# Patient Record
Sex: FEMALE | Race: WHITE | NOT HISPANIC OR LATINO
[De-identification: names, ages, dates, MRNs, and addresses within clinical notes are randomized per-mention and may not be internally consistent; named-entity substitution may affect disease eponyms.]

---

## 2018-02-23 ENCOUNTER — APPOINTMENT (OUTPATIENT)
Dept: INTERNAL MEDICINE | Facility: CLINIC | Age: 53
End: 2018-02-23

## 2018-02-23 PROBLEM — Z00.00 ENCOUNTER FOR PREVENTIVE HEALTH EXAMINATION: Status: ACTIVE | Noted: 2018-02-23

## 2019-01-14 ENCOUNTER — INPATIENT (INPATIENT)
Facility: HOSPITAL | Age: 54
LOS: 2 days | Discharge: HOME | End: 2019-01-17
Attending: HOSPITALIST | Admitting: HOSPITALIST
Payer: COMMERCIAL

## 2019-01-14 VITALS
DIASTOLIC BLOOD PRESSURE: 73 MMHG | RESPIRATION RATE: 18 BRPM | OXYGEN SATURATION: 100 % | TEMPERATURE: 98 F | HEART RATE: 87 BPM | SYSTOLIC BLOOD PRESSURE: 121 MMHG

## 2019-01-14 DIAGNOSIS — Z90.711 ACQUIRED ABSENCE OF UTERUS WITH REMAINING CERVICAL STUMP: Chronic | ICD-10-CM

## 2019-01-14 DIAGNOSIS — Z01.818 ENCOUNTER FOR OTHER PREPROCEDURAL EXAMINATION: ICD-10-CM

## 2019-01-14 DIAGNOSIS — N80.9 ENDOMETRIOSIS, UNSPECIFIED: Chronic | ICD-10-CM

## 2019-01-14 DIAGNOSIS — K92.2 GASTROINTESTINAL HEMORRHAGE, UNSPECIFIED: ICD-10-CM

## 2019-01-14 DIAGNOSIS — Z29.9 ENCOUNTER FOR PROPHYLACTIC MEASURES, UNSPECIFIED: ICD-10-CM

## 2019-01-14 DIAGNOSIS — D50.0 IRON DEFICIENCY ANEMIA SECONDARY TO BLOOD LOSS (CHRONIC): ICD-10-CM

## 2019-01-14 LAB
ALBUMIN SERPL ELPH-MCNC: 3.9 G/DL — SIGNIFICANT CHANGE UP (ref 3.3–5)
ALP SERPL-CCNC: 70 U/L — SIGNIFICANT CHANGE UP (ref 40–120)
ALT FLD-CCNC: 14 U/L — SIGNIFICANT CHANGE UP (ref 4–33)
ANION GAP SERPL CALC-SCNC: 10 MEQ/L — SIGNIFICANT CHANGE UP (ref 7–14)
APTT BLD: 26.3 SEC — LOW (ref 27.5–36.3)
AST SERPL-CCNC: 15 U/L — SIGNIFICANT CHANGE UP (ref 4–32)
BASE EXCESS BLDV CALC-SCNC: 0.3 MMOL/L — SIGNIFICANT CHANGE UP
BASOPHILS # BLD AUTO: 0.05 K/UL — SIGNIFICANT CHANGE UP (ref 0–0.2)
BASOPHILS NFR BLD AUTO: 0.6 % — SIGNIFICANT CHANGE UP (ref 0–2)
BILIRUB SERPL-MCNC: 0.2 MG/DL — SIGNIFICANT CHANGE UP (ref 0.2–1.2)
BLD GP AB SCN SERPL QL: NEGATIVE — SIGNIFICANT CHANGE UP
BLOOD GAS VENOUS - CREATININE: 0.72 MG/DL — SIGNIFICANT CHANGE UP (ref 0.5–1.3)
BUN SERPL-MCNC: 14 MG/DL — SIGNIFICANT CHANGE UP (ref 7–23)
CALCIUM SERPL-MCNC: 8.9 MG/DL — SIGNIFICANT CHANGE UP (ref 8.4–10.5)
CHLORIDE BLDV-SCNC: 107 MMOL/L — SIGNIFICANT CHANGE UP (ref 96–108)
CHLORIDE SERPL-SCNC: 106 MMOL/L — SIGNIFICANT CHANGE UP (ref 98–107)
CO2 SERPL-SCNC: 22 MMOL/L — SIGNIFICANT CHANGE UP (ref 22–31)
CREAT SERPL-MCNC: 0.67 MG/DL — SIGNIFICANT CHANGE UP (ref 0.5–1.3)
EOSINOPHIL # BLD AUTO: 0.26 K/UL — SIGNIFICANT CHANGE UP (ref 0–0.5)
EOSINOPHIL NFR BLD AUTO: 2.9 % — SIGNIFICANT CHANGE UP (ref 0–6)
FERRITIN SERPL-MCNC: < 5 NG/ML — LOW (ref 15–150)
FOLATE SERPL-MCNC: 16.1 NG/ML — SIGNIFICANT CHANGE UP (ref 4.7–20)
GAS PNL BLDV: 139 MMOL/L — SIGNIFICANT CHANGE UP (ref 136–146)
GLUCOSE BLDV-MCNC: 99 — SIGNIFICANT CHANGE UP (ref 70–99)
GLUCOSE SERPL-MCNC: 99 MG/DL — SIGNIFICANT CHANGE UP (ref 70–99)
HCO3 BLDV-SCNC: 24 MMOL/L — SIGNIFICANT CHANGE UP (ref 20–27)
HCT VFR BLD CALC: 23.3 % — LOW (ref 34.5–45)
HCT VFR BLD CALC: 24.4 % — LOW (ref 34.5–45)
HCT VFR BLDV CALC: 21.9 % — LOW (ref 34.5–45)
HGB BLD-MCNC: 6.8 G/DL — CRITICAL LOW (ref 11.5–15.5)
HGB BLD-MCNC: 7.3 G/DL — LOW (ref 11.5–15.5)
HGB BLDV-MCNC: 7 G/DL — CRITICAL LOW (ref 11.5–15.5)
IMM GRANULOCYTES NFR BLD AUTO: 0.3 % — SIGNIFICANT CHANGE UP (ref 0–1.5)
INR BLD: 0.97 — SIGNIFICANT CHANGE UP (ref 0.88–1.17)
IRON SATN MFR SERPL: 466 UG/DL — SIGNIFICANT CHANGE UP (ref 140–530)
IRON SATN MFR SERPL: 7 UG/DL — LOW (ref 30–160)
LACTATE BLDV-MCNC: 0.9 MMOL/L — SIGNIFICANT CHANGE UP (ref 0.5–2)
LYMPHOCYTES # BLD AUTO: 3.79 K/UL — HIGH (ref 1–3.3)
LYMPHOCYTES # BLD AUTO: 42.1 % — SIGNIFICANT CHANGE UP (ref 13–44)
MCHC RBC-ENTMCNC: 22.9 PG — LOW (ref 27–34)
MCHC RBC-ENTMCNC: 23.5 PG — LOW (ref 27–34)
MCHC RBC-ENTMCNC: 29.2 % — LOW (ref 32–36)
MCHC RBC-ENTMCNC: 29.9 % — LOW (ref 32–36)
MCV RBC AUTO: 78.5 FL — LOW (ref 80–100)
MCV RBC AUTO: 78.7 FL — LOW (ref 80–100)
MONOCYTES # BLD AUTO: 0.93 K/UL — HIGH (ref 0–0.9)
MONOCYTES NFR BLD AUTO: 10.3 % — SIGNIFICANT CHANGE UP (ref 2–14)
NEUTROPHILS # BLD AUTO: 3.95 K/UL — SIGNIFICANT CHANGE UP (ref 1.8–7.4)
NEUTROPHILS NFR BLD AUTO: 43.8 % — SIGNIFICANT CHANGE UP (ref 43–77)
NRBC # FLD: 0 K/UL — LOW (ref 25–125)
NRBC # FLD: 0 K/UL — LOW (ref 25–125)
OB PNL STL: POSITIVE — SIGNIFICANT CHANGE UP
PCO2 BLDV: 45 MMHG — SIGNIFICANT CHANGE UP (ref 41–51)
PH BLDV: 7.37 PH — SIGNIFICANT CHANGE UP (ref 7.32–7.43)
PLATELET # BLD AUTO: 309 K/UL — SIGNIFICANT CHANGE UP (ref 150–400)
PLATELET # BLD AUTO: 336 K/UL — SIGNIFICANT CHANGE UP (ref 150–400)
PMV BLD: 11.9 FL — SIGNIFICANT CHANGE UP (ref 7–13)
PMV BLD: 12.5 FL — SIGNIFICANT CHANGE UP (ref 7–13)
PO2 BLDV: < 24 MMHG — LOW (ref 35–40)
POTASSIUM BLDV-SCNC: 3.7 MMOL/L — SIGNIFICANT CHANGE UP (ref 3.4–4.5)
POTASSIUM SERPL-MCNC: 3.9 MMOL/L — SIGNIFICANT CHANGE UP (ref 3.5–5.3)
POTASSIUM SERPL-SCNC: 3.9 MMOL/L — SIGNIFICANT CHANGE UP (ref 3.5–5.3)
PROT SERPL-MCNC: 6.9 G/DL — SIGNIFICANT CHANGE UP (ref 6–8.3)
PROTHROM AB SERPL-ACNC: 11 SEC — SIGNIFICANT CHANGE UP (ref 9.8–13.1)
RBC # BLD: 2.97 M/UL — LOW (ref 3.8–5.2)
RBC # BLD: 3.1 M/UL — LOW (ref 3.8–5.2)
RBC # FLD: 14.6 % — HIGH (ref 10.3–14.5)
RBC # FLD: 14.7 % — HIGH (ref 10.3–14.5)
RH IG SCN BLD-IMP: POSITIVE — SIGNIFICANT CHANGE UP
RH IG SCN BLD-IMP: POSITIVE — SIGNIFICANT CHANGE UP
SAO2 % BLDV: 26.7 % — LOW (ref 60–85)
SODIUM SERPL-SCNC: 138 MMOL/L — SIGNIFICANT CHANGE UP (ref 135–145)
TROPONIN T, HIGH SENSITIVITY: < 6 NG/L — SIGNIFICANT CHANGE UP (ref ?–14)
UIBC SERPL-MCNC: 458.9 UG/DL — HIGH (ref 110–370)
VIT B12 SERPL-MCNC: 396 PG/ML — SIGNIFICANT CHANGE UP (ref 200–900)
WBC # BLD: 8.12 K/UL — SIGNIFICANT CHANGE UP (ref 3.8–10.5)
WBC # BLD: 9.01 K/UL — SIGNIFICANT CHANGE UP (ref 3.8–10.5)
WBC # FLD AUTO: 8.12 K/UL — SIGNIFICANT CHANGE UP (ref 3.8–10.5)
WBC # FLD AUTO: 9.01 K/UL — SIGNIFICANT CHANGE UP (ref 3.8–10.5)

## 2019-01-14 PROCEDURE — 99223 1ST HOSP IP/OBS HIGH 75: CPT | Mod: GC

## 2019-01-14 PROCEDURE — 99253 IP/OBS CNSLTJ NEW/EST LOW 45: CPT | Mod: GC

## 2019-01-14 RX ORDER — PANTOPRAZOLE SODIUM 20 MG/1
80 TABLET, DELAYED RELEASE ORAL ONCE
Qty: 0 | Refills: 0 | Status: COMPLETED | OUTPATIENT
Start: 2019-01-14 | End: 2019-01-14

## 2019-01-14 RX ORDER — IRON SUCROSE 20 MG/ML
200 INJECTION, SOLUTION INTRAVENOUS ONCE
Qty: 0 | Refills: 0 | Status: COMPLETED | OUTPATIENT
Start: 2019-01-14 | End: 2019-01-14

## 2019-01-14 RX ORDER — PANTOPRAZOLE SODIUM 20 MG/1
40 TABLET, DELAYED RELEASE ORAL
Qty: 0 | Refills: 0 | Status: DISCONTINUED | OUTPATIENT
Start: 2019-01-14 | End: 2019-01-17

## 2019-01-14 RX ORDER — INFLUENZA VIRUS VACCINE 15; 15; 15; 15 UG/.5ML; UG/.5ML; UG/.5ML; UG/.5ML
0.5 SUSPENSION INTRAMUSCULAR ONCE
Qty: 0 | Refills: 0 | Status: DISCONTINUED | OUTPATIENT
Start: 2019-01-14 | End: 2019-01-17

## 2019-01-14 RX ADMIN — PANTOPRAZOLE SODIUM 80 MILLIGRAM(S): 20 TABLET, DELAYED RELEASE ORAL at 15:10

## 2019-01-14 RX ADMIN — PANTOPRAZOLE SODIUM 40 MILLIGRAM(S): 20 TABLET, DELAYED RELEASE ORAL at 18:18

## 2019-01-14 RX ADMIN — IRON SUCROSE 220 MILLIGRAM(S): 20 INJECTION, SOLUTION INTRAVENOUS at 21:25

## 2019-01-14 NOTE — ED ADULT NURSE NOTE - ED STAT RN HANDOFF DETAILS
Patient is A&Ox4, aware of plan of care., and has [facility] spot available.  Report given LYNNETTE Fairbanks in [facility].  Patient awaiting transportation.  Will continue to monitor patient closely. DARIN Blair R.N.

## 2019-01-14 NOTE — H&P ADULT - NSHPSOCIALHISTORY_GEN_ALL_CORE
Lives at home with daughter and kids. Social drinker, denies any cigarette use. denies recreational drug use

## 2019-01-14 NOTE — ED ADULT TRIAGE NOTE - CHIEF COMPLAINT QUOTE
Pt with no PMH sent from urgent care for Hgb of 6.9 and RBC 2.98. Pt states over the past few weeks she's been having lightheadedness, dizziness, SOB, had a near syncopal episode a few days ago and went to urgent care. Pt denies PMH. Reports noticing dark stools x 2 weeks, denies any other signs of bleeding, c/o mild abd discomfort. Appears pale.

## 2019-01-14 NOTE — ED ADULT NURSE NOTE - OBJECTIVE STATEMENT
pt presents with several days of dizziness and light headed with slight SOB. pt also c/o dark tarry stool as well as LUQ pain for the same time period. pt denies blood thinners denies NVD. in ED Hgb noted to be low and will transfuse as per orders, pt agreed and I signed consent. IV 20 g present in RAC labs have been sent TQ removed. VSS will CTM.

## 2019-01-14 NOTE — H&P ADULT - PROBLEM SELECTOR PLAN 3
- patient has no hx of cerebrovascular disease, ischemic heart disease or prior hx of heart failure. Not diabetic and not on insulin, with creatinine of <2   - EKG: no evidence of ischemic changes  - METS score >10   - Patient is low risk for developing a major cardiac outcome for a low risk procedure

## 2019-01-14 NOTE — H&P ADULT - ATTENDING COMMENTS
Agree with Housestaff Note Above, edits made where appropriate, case discussed with housestaff    Patient seen and examined. This is a 53F being admitted for symptomatic anemia. Denies NSAID use, never had colonoscopy, no history of steroid use. (+) Hx of acid reflux. Agree with 1U pRBC + IV Iron. Await GI results of EGD and Colonoscopy. Patient to receive pRBC for medical optimization prior to EGD/Colonoscopy. RCRI otherwise is 0/6 or 0.4% MACE Risk.

## 2019-01-14 NOTE — ED PROVIDER NOTE - MEDICAL DECISION MAKING DETAILS
Julio KEITH MD PGY1: 53 F no contributory PMH p/w symptomatic anemia of 6.9 on outside labs from likely UGIB. Will repeat labs and type and screen to evaluate for need for transfusion. Possible admission for anemia and colonoscopic evaluation.

## 2019-01-14 NOTE — CONSULT NOTE ADULT - ASSESSMENT
Impression;  1. Anemia- DDX includes CRC, PUD, gastric cancer, angioectasias  2. Chest pressure likely symptomatic anemia, need to r/o ACS    Plan:  1. Recommend cardiac clearance prior to EGD/Colonoscopy  2. Once cleared, will plan for EGD/Colonoscopy +/- video capsule on Wed  3. Regular diet today, clears tomorrow  4. trend cbc Impression;  1. Anemia iron def- DDX includes CRC, PUD, gastric cancer, angioectasias  2. Chest pressure likely symptomatic anemia, need to r/o ACS    Plan:  1. Recommend cardiac clearance prior to EGD/Colonoscopy  2. Once cleared, will plan for EGD/Colonoscopy +/- video capsule on Wed  3. Regular diet today, clears tomorrow  4. trend cbc  5. PPI IV BID

## 2019-01-14 NOTE — ED PROVIDER NOTE - OBJECTIVE STATEMENT
Julio KEITH MD PGY1: 53 F no PMH referred from  for Hgb of 6.9 discovered on w/u of near-syncope. Has arthur feeling lightheadedness, dizziness and worsening SOB x 2 weeks. Culminatedi n near-syncope episode yesterday where patient felt extremely faint without passing out or blacking out. Went to  and had routine labs drawn. Of note has been having dark black stools x 2 weeks. No history of colonoscopy. No abdominal pain, nausea or emesis.

## 2019-01-14 NOTE — H&P ADULT - NSHPPHYSICALEXAM_GEN_ALL_CORE
General: middle aged female, NAD   HEENT: PERRL, EOMI. Conjunctival pallor  Neck: Supple, no JVD  Cardiac: RRR, normal s1 and s2, no murmur   Lungs: CTAB, no wheezes rales or rhonchi   Abdomen: soft, nondistended abdomen. Bowel sounds present. Mild TTP in the right part of the abdomen and epigastric region, no organomegaly   Extremities: no cyanosis, pitting or edema.   Neuro: AAO x 4, Cr II - XII intact, 5/5 strength in UE and LE   Skin: no open lesions or sores.

## 2019-01-14 NOTE — H&P ADULT - PROBLEM SELECTOR PLAN 1
- Microcytic anemia with Hb 6.8 - - (only baseline we have is from two years ago with Hb in 12 range), currently hemodynamically stable   - patient is severely iron deficient based on iron panel: would start venofer for iron repletion while in the hospital   - ordered for 1 unit PRBC at this time, will check post transfusion cbc   - seen by GI: stool on exam is brown at this time, will aim for EGD/Colonoscopy on Wednesday, will need to be on clears tomorrow   - PPi IV BID

## 2019-01-14 NOTE — CONSULT NOTE ADULT - SUBJECTIVE AND OBJECTIVE BOX
Chief Complaint:  Patient is a 53y old  Female who presents with a chief complaint of anemia    Interval Events: 52 y/o female with anemia referred by urgent care for anemia. She has been having SOB for past few weeks and chest pressure on exertion. She notes dark stools for few weeks as well. She has weight gain. She denies EGD/Colon in the past. No asa/nsaid use. Her mom has anemia and has needed transfusions in the past she thinks due to colon polyps?    Allergies:  penicillin (Unknown)  sulfa drugs (Unknown)      Hospital Medications:  pantoprazole  Injectable 80 milliGRAM(s) IV Push once      PMHX/PSHX:  No pertinent past medical history  S/P partial hysterectomy  Endometriosis      Family history:  no pertinent history in first degree relative    ROS:     General:  No fevers, chills  Eyes:  Good vision  ENT:  No odynophagia, dysphagia  CV:  No pain, palpitations  Resp:  No dyspnea, cough, tachypnea, wheezing  GI:  See HPI  Muscle:  No pain, weakness  Skin:  No rash, edema      PHYSICAL EXAM:     GENERAL:  No distress  HEENT: conjunctivae clear  CHEST:  Full & symmetric excursion, no increased effort  HEART:  Regular rhythm  ABDOMEN:  Soft, non-tender, non-distended  EXTREMITIES:  no edema  SKIN:  Dry/warm  NEURO:  Alert  Rectal: chaperoned by medicine resident, brown stools on lesions    Vital Signs:  Vital Signs Last 24 Hrs  T(C): 36.8 (14 Jan 2019 12:01), Max: 36.8 (14 Jan 2019 12:01)  T(F): 98.3 (14 Jan 2019 12:01), Max: 98.3 (14 Jan 2019 12:01)  HR: 76 (14 Jan 2019 13:38) (76 - 87)  BP: 119/86 (14 Jan 2019 13:38) (119/86 - 121/73)  BP(mean): --  RR: 18 (14 Jan 2019 13:38) (18 - 18)  SpO2: 99% (14 Jan 2019 13:38) (99% - 100%)  Daily     Daily     LABS:                        6.8    9.01  )-----------( 336      ( 14 Jan 2019 13:10 )             23.3     01-14    138  |  106  |  14  ----------------------------<  99  3.9   |  22  |  0.67    Ca    8.9      14 Jan 2019 13:10    TPro  6.9  /  Alb  3.9  /  TBili  0.2  /  DBili  x   /  AST  15  /  ALT  14  /  AlkPhos  70  01-14    LIVER FUNCTIONS - ( 14 Jan 2019 13:10 )  Alb: 3.9 g/dL / Pro: 6.9 g/dL / ALK PHOS: 70 u/L / ALT: 14 u/L / AST: 15 u/L / GGT: x           PT/INR - ( 14 Jan 2019 13:10 )   PT: 11.0 SEC;   INR: 0.97          PTT - ( 14 Jan 2019 13:10 )  PTT:26.3 SEC        Imaging:

## 2019-01-14 NOTE — H&P ADULT - PROBLEM SELECTOR PLAN 2
- melanotic stools with epigastric tenderness suggestive of possible gastric ulcer as source  - will start PPI IV BID and plan for EGD and colonoscopy on Wednesday as per GI

## 2019-01-14 NOTE — H&P ADULT - NSHPLABSRESULTS_GEN_ALL_CORE
EKG, Imaging and results personally reviewed by me     EKG: NSR, no ST - T wave changes                          6.8    9.01  )-----------( 336      ( 14 Jan 2019 13:10 )             23.3    01-14    138  |  106  |  14  ----------------------------<  99  3.9   |  22  |  0.67    Ca    8.9      14 Jan 2019 13:10    TPro  6.9  /  Alb  3.9  /  TBili  0.2  /  DBili  x   /  AST  15  /  ALT  14  /  AlkPhos  70  01-14    PT/INR - ( 14 Jan 2019 13:10 )   PT: 11.0 SEC;   INR: 0.97          PTT - ( 14 Jan 2019 13:10 )  PTT:26.3 SEC    Iron Total: 7  Ferritin <5   Lactate 0.9

## 2019-01-14 NOTE — ED PROVIDER NOTE - PHYSICAL EXAMINATION
Julio KEITH MD PGY1:   PHYSICAL EXAM:    GENERAL: NAD, well-developed  HEENT:  Atraumatic, Normocephalic. Conjuntival pallor. Pale face.   CHEST/LUNG: Chest rise equal bilaterally. Clear to auscultation bilaterally.   HEART: Regular rate and rhythm. No murmurs or rubs.   ABDOMEN: Soft, Nontender, Nondistended; Normoactive bowel sounds  EXTREMITIES:  2+ Peripheral Pulses.  PATRICK: Dark stool in rectal vault. FOBT sent. No hemorrhoids or fissures.  PSYCH: A&Ox3  SKIN: No obvious rashes or lesions

## 2019-01-14 NOTE — H&P ADULT - NSHPREVIEWOFSYSTEMS_GEN_ALL_CORE
General: denies any fevers, chills, nausea, vomiting   HEENT: denies any vision changes, trouble or pain with swallowing   Cardiac: denies any chest pain or pressure. Intermittent palpitations   Lungs: denies cough, or pleuritic chest pain  Abdomen: abdominal discomfort, with dark stool. Was constipated but has since resolved. Denies any diarrhea   Extremities: no focal pain, weakness or numbness. Denies any sensory loss.  Neuro: denies any current dizziness, lightheadedness, focal weakness, numbness or sensory loss   Skin: denies any open lesions or sores   Heme: denies vaginal bleeding, denies any hemoptysis/hematemesis, hematuria, denies any bleeding or bruising   Psych: denies any SI/HI

## 2019-01-14 NOTE — ED PROVIDER NOTE - ATTENDING CONTRIBUTION TO CARE
52 y/o f presents with anemia and dark stools. Patient notes for last few weeks she has had progressive jackson, fatigue, and intermittent epigastric pain, a/w dark colored stools. Never occurred before, went to an uc yesterday and was found to have low h/h. No fevers, ha, cp, sob at rest, vomiting, diarrhea, dysuria, b/l le edema.   exam  GEN - NAD; well appearing; A+O x3   HEAD - NC/AT   EYES- PERRL, EOMI  ENT: Airway patent, mmm, Oral cavity and pharynx normal. No inflammation, swelling, exudate, or lesions.  NECK: Neck supple, non-tender without lymphadenopathy, no masses.  PULMONARY - CTA b/l, symmetric breath sounds.   CARDIAC -s1s2, RRR, no M,G,R  ABDOMEN - +BS, ND, NT, soft, no guarding, no rebound, no masses   BACK - no CVA tenderness, Normal  spine   EXTREMITIES - FROM, symmetric pulses, capillary refill < 2 seconds, no edema   SKIN - no rash or bruising   NEUROLOGIC - alert, speech clear, no focal deficits  PSYCH -nl mood/affect, nl insight.  a/p-patient is 52 y/o f with new anemia, dark stools, concern for poss gi bleed, nontoxic appearing, abd nontender, will check labs, transfuse pending cbc results, monitor, and reass.

## 2019-01-15 LAB
ALBUMIN SERPL ELPH-MCNC: 3.8 G/DL — SIGNIFICANT CHANGE UP (ref 3.3–5)
ALP SERPL-CCNC: 64 U/L — SIGNIFICANT CHANGE UP (ref 40–120)
ALT FLD-CCNC: 15 U/L — SIGNIFICANT CHANGE UP (ref 4–33)
ANION GAP SERPL CALC-SCNC: 9 MEQ/L — SIGNIFICANT CHANGE UP (ref 7–14)
APTT BLD: 26.8 SEC — LOW (ref 27.5–36.3)
AST SERPL-CCNC: 17 U/L — SIGNIFICANT CHANGE UP (ref 4–32)
BILIRUB SERPL-MCNC: 0.4 MG/DL — SIGNIFICANT CHANGE UP (ref 0.2–1.2)
BUN SERPL-MCNC: 11 MG/DL — SIGNIFICANT CHANGE UP (ref 7–23)
CALCIUM SERPL-MCNC: 8.7 MG/DL — SIGNIFICANT CHANGE UP (ref 8.4–10.5)
CHLORIDE SERPL-SCNC: 108 MMOL/L — HIGH (ref 98–107)
CO2 SERPL-SCNC: 23 MMOL/L — SIGNIFICANT CHANGE UP (ref 22–31)
CREAT SERPL-MCNC: 0.68 MG/DL — SIGNIFICANT CHANGE UP (ref 0.5–1.3)
GLUCOSE SERPL-MCNC: 100 MG/DL — HIGH (ref 70–99)
HCT VFR BLD CALC: 25.6 % — LOW (ref 34.5–45)
HCT VFR BLD CALC: 26.7 % — LOW (ref 34.5–45)
HGB BLD-MCNC: 7.8 G/DL — LOW (ref 11.5–15.5)
HGB BLD-MCNC: 7.8 G/DL — LOW (ref 11.5–15.5)
INR BLD: 1.02 — SIGNIFICANT CHANGE UP (ref 0.88–1.17)
MAGNESIUM SERPL-MCNC: 2.1 MG/DL — SIGNIFICANT CHANGE UP (ref 1.6–2.6)
MCHC RBC-ENTMCNC: 23 PG — LOW (ref 27–34)
MCHC RBC-ENTMCNC: 23.4 PG — LOW (ref 27–34)
MCHC RBC-ENTMCNC: 29.2 % — LOW (ref 32–36)
MCHC RBC-ENTMCNC: 30.5 % — LOW (ref 32–36)
MCV RBC AUTO: 76.6 FL — LOW (ref 80–100)
MCV RBC AUTO: 78.8 FL — LOW (ref 80–100)
NRBC # FLD: 0 K/UL — LOW (ref 25–125)
NRBC # FLD: 0 K/UL — LOW (ref 25–125)
PHOSPHATE SERPL-MCNC: 4.1 MG/DL — SIGNIFICANT CHANGE UP (ref 2.5–4.5)
PLATELET # BLD AUTO: 310 K/UL — SIGNIFICANT CHANGE UP (ref 150–400)
PLATELET # BLD AUTO: 321 K/UL — SIGNIFICANT CHANGE UP (ref 150–400)
PMV BLD: 11.7 FL — SIGNIFICANT CHANGE UP (ref 7–13)
PMV BLD: 12.4 FL — SIGNIFICANT CHANGE UP (ref 7–13)
POTASSIUM SERPL-MCNC: 4.1 MMOL/L — SIGNIFICANT CHANGE UP (ref 3.5–5.3)
POTASSIUM SERPL-SCNC: 4.1 MMOL/L — SIGNIFICANT CHANGE UP (ref 3.5–5.3)
PROT SERPL-MCNC: 6.3 G/DL — SIGNIFICANT CHANGE UP (ref 6–8.3)
PROTHROM AB SERPL-ACNC: 11.6 SEC — SIGNIFICANT CHANGE UP (ref 9.8–13.1)
RBC # BLD: 3.34 M/UL — LOW (ref 3.8–5.2)
RBC # BLD: 3.39 M/UL — LOW (ref 3.8–5.2)
RBC # FLD: 14.6 % — HIGH (ref 10.3–14.5)
RBC # FLD: 14.7 % — HIGH (ref 10.3–14.5)
SODIUM SERPL-SCNC: 140 MMOL/L — SIGNIFICANT CHANGE UP (ref 135–145)
TROPONIN T, HIGH SENSITIVITY: 8 NG/L — SIGNIFICANT CHANGE UP (ref ?–14)
WBC # BLD: 6.64 K/UL — SIGNIFICANT CHANGE UP (ref 3.8–10.5)
WBC # BLD: 6.98 K/UL — SIGNIFICANT CHANGE UP (ref 3.8–10.5)
WBC # FLD AUTO: 6.64 K/UL — SIGNIFICANT CHANGE UP (ref 3.8–10.5)
WBC # FLD AUTO: 6.98 K/UL — SIGNIFICANT CHANGE UP (ref 3.8–10.5)

## 2019-01-15 PROCEDURE — 99232 SBSQ HOSP IP/OBS MODERATE 35: CPT | Mod: GC

## 2019-01-15 PROCEDURE — 99233 SBSQ HOSP IP/OBS HIGH 50: CPT | Mod: GC

## 2019-01-15 RX ORDER — SOD SULF/SODIUM/NAHCO3/KCL/PEG
1000 SOLUTION, RECONSTITUTED, ORAL ORAL EVERY 4 HOURS
Qty: 0 | Refills: 0 | Status: COMPLETED | OUTPATIENT
Start: 2019-01-15 | End: 2019-01-15

## 2019-01-15 RX ORDER — ONDANSETRON 8 MG/1
4 TABLET, FILM COATED ORAL ONCE
Qty: 0 | Refills: 0 | Status: COMPLETED | OUTPATIENT
Start: 2019-01-15 | End: 2019-01-15

## 2019-01-15 RX ORDER — ACETAMINOPHEN 500 MG
650 TABLET ORAL EVERY 6 HOURS
Qty: 0 | Refills: 0 | Status: DISCONTINUED | OUTPATIENT
Start: 2019-01-15 | End: 2019-01-17

## 2019-01-15 RX ORDER — ACETAMINOPHEN 500 MG
650 TABLET ORAL ONCE
Qty: 0 | Refills: 0 | Status: COMPLETED | OUTPATIENT
Start: 2019-01-15 | End: 2019-01-15

## 2019-01-15 RX ADMIN — Medication 1000 MILLILITER(S): at 18:39

## 2019-01-15 RX ADMIN — ONDANSETRON 4 MILLIGRAM(S): 8 TABLET, FILM COATED ORAL at 06:37

## 2019-01-15 RX ADMIN — Medication 650 MILLIGRAM(S): at 11:08

## 2019-01-15 RX ADMIN — Medication 650 MILLIGRAM(S): at 07:06

## 2019-01-15 RX ADMIN — Medication 650 MILLIGRAM(S): at 12:00

## 2019-01-15 RX ADMIN — PANTOPRAZOLE SODIUM 40 MILLIGRAM(S): 20 TABLET, DELAYED RELEASE ORAL at 06:12

## 2019-01-15 RX ADMIN — PANTOPRAZOLE SODIUM 40 MILLIGRAM(S): 20 TABLET, DELAYED RELEASE ORAL at 18:18

## 2019-01-15 RX ADMIN — Medication 650 MILLIGRAM(S): at 06:36

## 2019-01-15 RX ADMIN — Medication 1000 MILLILITER(S): at 23:08

## 2019-01-15 NOTE — PROGRESS NOTE ADULT - ASSESSMENT
53 year old female with hx of hemorrhoids who presents because of anemia on blood work with melanotic stool concerning for GIB

## 2019-01-15 NOTE — PROGRESS NOTE ADULT - PROBLEM SELECTOR PLAN 1
- Microcytic anemia with Hb 6.8 - - (only baseline we have is from two years ago with Hb in 12 range), currently hemodynamically stable   - patient is severely iron deficient based on iron panel: would start venofer for iron repletion while in the hospital   - ordered for 1 unit PRBC at this time, will check post transfusion cbc   - seen by GI: stool on exam is brown at this time, will aim for EGD/Colonoscopy on Wednesday, will need to be on clears tomorrow   - PPi IV BID - Microcytic anemia with Hb 6.8 - - (only baseline we have is from two years ago with Hb in 12 range), currently hemodynamically stable   - patient is severely iron deficient based on iron panel: would start venofer for iron repletion while in the hospital   - ordered for 1 unit PRBC at this time, will check post transfusion cbc   - seen by GI: stool on exam is brown at this time, will aim for EGD/Colonoscopy on Wednesday  -clear liquid diet today  - PPi IV BID - Microcytic anemia with Hb 6.8 - - (only baseline we have is from two years ago with Hb in 12 range), Hgb today is 7.8, currently hemodynamically stable   - s/p 1 unit PRBC   - seen by GI: stool on exam is brown at this time, will aim for EGD/Colonoscopy on Wednesday  -clear liquid diet today  - PPi IV BID  -f/u CBC at 6:30pm, if <8 transfuse to optimize for EGD/colonoscopy tomorrow.

## 2019-01-15 NOTE — PROGRESS NOTE ADULT - SUBJECTIVE AND OBJECTIVE BOX
Enedelia Arevalo MD-PGY1  Department of Internal Medicine  Pager [phone]        SAMANTHA BECK  53y  MRN: [id number]    Patient is a 53y old  Female who presents with a chief complaint of Progressively short of breath with near syncopal episode, anemic on outside labs (14 Jan 2019 15:31)      Subjective: no events ON. Denies fever, CP, SOB, abn pain, N/V. Tolerating diet.      MEDICATIONS  (STANDING):  influenza   Vaccine 0.5 milliLiter(s) IntraMuscular once  pantoprazole  Injectable 40 milliGRAM(s) IV Push two times a day    MEDICATIONS  (PRN):  acetaminophen   Tablet .. 650 milliGRAM(s) Oral every 6 hours PRN Mild Pain (1 - 3), Moderate Pain (4 - 6)      Objective:    Vitals: Vital Signs Last 24 Hrs  T(C): 36.8 (01-15-19 @ 06:11), Max: 37.5 (01-14-19 @ 20:48)  T(F): 98.3 (01-15-19 @ 06:11), Max: 99.5 (01-14-19 @ 20:48)  HR: 76 (01-15-19 @ 06:11) (70 - 87)  BP: 109/62 (01-15-19 @ 06:11) (104/45 - 128/62)  BP(mean): --  RR: 17 (01-15-19 @ 06:11) (17 - 18)  SpO2: 100% (01-15-19 @ 06:11) (99% - 100%)              I&O's Summary      PHYSICAL EXAM:  GENERAL: NAD  HEAD:  Atraumatic, Normocephalic  EYES: EOMI, conjunctiva and sclera clear  CHEST/LUNG: Clear to percussion bilaterally; No rales, rhonchi, wheezing  HEART: Regular rate and rhythm; No murmurs, rubs, or gallops  ABDOMEN: Soft, Nontender, Nondistended; no rebound or guarding  SKIN: No rashes or lesions  NERVOUS SYSTEM:  Alert & Oriented X3    LABS:                        7.8    6.64  )-----------( 310      ( 15 Moo 2019 06:30 )             25.6                         7.3    8.12  )-----------( 309      ( 14 Jan 2019 19:15 )             24.4                         6.8    9.01  )-----------( 336      ( 14 Jan 2019 13:10 )             23.3     01-14    138  |  106  |  14  ----------------------------<  99  3.9   |  22  |  0.67    Ca    8.9      14 Jan 2019 13:10    TPro  6.9  /  Alb  3.9  /  TBili  0.2  /  DBili  x   /  AST  15  /  ALT  14  /  AlkPhos  70  01-14    PT/INR - ( 14 Jan 2019 13:10 )   PT: 11.0 SEC;   INR: 0.97          PTT - ( 14 Jan 2019 13:10 )  PTT:26.3 SEC                  CAPILLARY BLOOD GLUCOSE          RADIOLOGY & ADDITIONAL TESTS:  Imaging Personally Reviewed:  [x ] YES  [ ] NO    Consultants involved in case:   Consultant(s) Notes Reviewed:  [ x] YES  [ ] NO:   Care Discussed with Consultants/Other Providers [x ] YES  [ ] NO Enedelia Arevalo MD-PGY1  Department of Internal Medicine  Pager [phone]        SAMANTHA BECK  53y  MRN: [id number]    Patient is a 53y old  Female who presents with a chief complaint of Progressively short of breath with near syncopal episode, anemic on outside labs (14 Jan 2019 15:31)      Subjective: no events ON. Denies fever, dizziness, lightheadedness, CP, SOB, abn pain, dyspepsia. She is feeling nauseous and had a L sided headache this morning.   PCP: Benitez Poe MD in [place]?  Pharmacy: [facility] in [place]    MEDICATIONS  (STANDING):  influenza   Vaccine 0.5 milliLiter(s) IntraMuscular once  pantoprazole  Injectable 40 milliGRAM(s) IV Push two times a day    MEDICATIONS  (PRN):  acetaminophen   Tablet .. 650 milliGRAM(s) Oral every 6 hours PRN Mild Pain (1 - 3), Moderate Pain (4 - 6)      Objective:    Vitals: Vital Signs Last 24 Hrs  T(C): 36.8 (01-15-19 @ 06:11), Max: 37.5 (01-14-19 @ 20:48)  T(F): 98.3 (01-15-19 @ 06:11), Max: 99.5 (01-14-19 @ 20:48)  HR: 76 (01-15-19 @ 06:11) (70 - 87)  BP: 109/62 (01-15-19 @ 06:11) (104/45 - 128/62)  BP(mean): --  RR: 17 (01-15-19 @ 06:11) (17 - 18)  SpO2: 100% (01-15-19 @ 06:11) (99% - 100%)              I&O's Summary      PHYSICAL EXAM:  GENERAL: NAD, pale middle aged woman  HEAD:  Atraumatic, Normocephalic  EYES: EOMI, conjunctival pallor  CHEST/LUNG: Clear to percussion bilaterally; No rales, rhonchi, wheezing  HEART: Regular rate and rhythm; No murmurs, rubs, or gallops  ABDOMEN: Soft, Nondistended; Mildly tender to palpation in mid abdomen, no rebound or guarding  SKIN: No rashes or lesions  NERVOUS SYSTEM:  Alert & Oriented X3    LABS:                        7.8    6.64  )-----------( 310      ( 15 Moo 2019 06:30 )             25.6                         7.3    8.12  )-----------( 309      ( 14 Jan 2019 19:15 )             24.4                         6.8    9.01  )-----------( 336      ( 14 Jan 2019 13:10 )             23.3     01-14    138  |  106  |  14  ----------------------------<  99  3.9   |  22  |  0.67    Ca    8.9      14 Jan 2019 13:10    TPro  6.9  /  Alb  3.9  /  TBili  0.2  /  DBili  x   /  AST  15  /  ALT  14  /  AlkPhos  70  01-14    PT/INR - ( 14 Jan 2019 13:10 )   PT: 11.0 SEC;   INR: 0.97          PTT - ( 14 Jan 2019 13:10 )  PTT:26.3 SEC                  CAPILLARY BLOOD GLUCOSE          RADIOLOGY & ADDITIONAL TESTS:  Imaging Personally Reviewed:  [x ] YES  [ ] NO    Consultants involved in case:   Consultant(s) Notes Reviewed:  [ x] YES  [ ] NO:   Care Discussed with Consultants/Other Providers [x ] YES  [ ] NO

## 2019-01-15 NOTE — PROGRESS NOTE ADULT - SUBJECTIVE AND OBJECTIVE BOX
Chief Complaint:  Patient is a 53y old  Female who presents with a chief complaint of Progressively short of breath with near syncopal episode, anemic on outside labs (15 Moo 2019 07:24)      Interval Events:   Patient with no more BM's since yesterday.      Allergies:  penicillin (Rash)  sulfa drugs (Other)      Hospital Medications:  acetaminophen   Tablet .. 650 milliGRAM(s) Oral every 6 hours PRN  influenza   Vaccine 0.5 milliLiter(s) IntraMuscular once  pantoprazole  Injectable 40 milliGRAM(s) IV Push two times a day      PMHX/PSHX:  Hemorrhoids  No pertinent past medical history  S/P partial hysterectomy  Endometriosis      Family history:  No pertinent family history in first degree relatives          PHYSICAL EXAM:     GENERAL:  Appears stated age, well-groomed, well-nourished, no distress  HEENT:  NC/AT,  conjunctivae clear, sclera -anicteric  CHEST:  Full & symmetric excursion, no increased  HEART:  Regular rhythm  ABDOMEN:  Soft, non-tender, non-distended, normoactive bowel sounds,  no masses ,no hepato-splenomegaly,   EXTREMITIES:  no cyanosis,clubbing or edema  SKIN:  No rash/erythema/ecchymoses/petechiae/wounds/abscess/warm/dry  NEURO:  Alert, oriented    Vital Signs:  Vital Signs Last 24 Hrs  T(C): 36.8 (15 Moo 2019 06:11), Max: 37.5 (14 Jan 2019 20:48)  T(F): 98.3 (15 Moo 2019 06:11), Max: 99.5 (14 Jan 2019 20:48)  HR: 76 (15 Moo 2019 06:11) (70 - 79)  BP: 109/62 (15 Moo 2019 06:11) (104/45 - 128/62)  BP(mean): --  RR: 17 (15 Moo 2019 06:11) (17 - 18)  SpO2: 100% (15 Moo 2019 06:11) (99% - 100%)  Daily Height in cm: 167.64 (14 Jan 2019 22:22)    Daily     LABS:                        7.8    6.64  )-----------( 310      ( 15 Moo 2019 06:30 )             25.6     01-15    140  |  108<H>  |  11  ----------------------------<  100<H>  4.1   |  23  |  0.68    Ca    8.7      15 Moo 2019 06:30  Phos  4.1     01-15  Mg     2.1     01-15    TPro  6.3  /  Alb  3.8  /  TBili  0.4  /  DBili  x   /  AST  17  /  ALT  15  /  AlkPhos  64  01-15    LIVER FUNCTIONS - ( 15 Moo 2019 06:30 )  Alb: 3.8 g/dL / Pro: 6.3 g/dL / ALK PHOS: 64 u/L / ALT: 15 u/L / AST: 17 u/L / GGT: x           PT/INR - ( 15 Moo 2019 06:30 )   PT: 11.6 SEC;   INR: 1.02          PTT - ( 15 Moo 2019 06:30 )  PTT:26.8 SEC        Imaging:

## 2019-01-15 NOTE — PROGRESS NOTE ADULT - ATTENDING COMMENTS
Pt is currently comfortable with only mild headache. Appropriate response to 1 unit PRBCs. Plan EGD/colo tomorrow. Pt has little PMHx, no active cardiac conditions (ED troponin negative), and excellent baseline functional capacity. She requires no cardiac testing prior to endoscopy.

## 2019-01-15 NOTE — PROGRESS NOTE ADULT - ATTENDING COMMENTS
Agree with management as above.    Discussed risks including but not limited to bleeding,infection,drug reaction,perforation requiring surgery,missed lesion, benefits and alternatives of EGD/Colonoscopy with patient including no treatment and patient consents to procedure.

## 2019-01-15 NOTE — PROGRESS NOTE ADULT - ASSESSMENT
Impression;  1. Anemia iron def- DDX includes CRC, PUD, gastric cancer, angioectasias  2. Chest pressure likely symptomatic anemia, need to r/o ACS    Plan:   - clears today   - trend cbc   - PPI IV BID   - Patient to have EGD/colonoscopy tomorrow   - Put patient on clear diet today    - patient to be NPO after midnight   - patient will need to take Movi Prep 1 Liter at 6:00 pm and 1 Liter at 10:00 pm   - ensure patient has completed entire prep and is having clear bowel movements    Eleonora Simmons, PGY-4  Gastroenterology Fellow  Pager x [phone] or [phone]  (After 5 pm or on weekends please page GI on call)

## 2019-01-16 ENCOUNTER — TRANSCRIPTION ENCOUNTER (OUTPATIENT)
Age: 54
End: 2019-01-16

## 2019-01-16 ENCOUNTER — RESULT REVIEW (OUTPATIENT)
Age: 54
End: 2019-01-16

## 2019-01-16 LAB
ANION GAP SERPL CALC-SCNC: 12 MEQ/L — SIGNIFICANT CHANGE UP (ref 7–14)
APTT BLD: 26.1 SEC — LOW (ref 27.5–36.3)
BASOPHILS # BLD AUTO: 0.04 K/UL — SIGNIFICANT CHANGE UP (ref 0–0.2)
BASOPHILS NFR BLD AUTO: 0.6 % — SIGNIFICANT CHANGE UP (ref 0–2)
BLD GP AB SCN SERPL QL: NEGATIVE — SIGNIFICANT CHANGE UP
BUN SERPL-MCNC: 8 MG/DL — SIGNIFICANT CHANGE UP (ref 7–23)
CALCIUM SERPL-MCNC: 8.7 MG/DL — SIGNIFICANT CHANGE UP (ref 8.4–10.5)
CHLORIDE SERPL-SCNC: 108 MMOL/L — HIGH (ref 98–107)
CO2 SERPL-SCNC: 22 MMOL/L — SIGNIFICANT CHANGE UP (ref 22–31)
CREAT SERPL-MCNC: 0.71 MG/DL — SIGNIFICANT CHANGE UP (ref 0.5–1.3)
EOSINOPHIL # BLD AUTO: 0.27 K/UL — SIGNIFICANT CHANGE UP (ref 0–0.5)
EOSINOPHIL NFR BLD AUTO: 4 % — SIGNIFICANT CHANGE UP (ref 0–6)
GLUCOSE SERPL-MCNC: 95 MG/DL — SIGNIFICANT CHANGE UP (ref 70–99)
HCG SERPL-ACNC: < 5 MIU/ML — SIGNIFICANT CHANGE UP
HCT VFR BLD CALC: 27.8 % — LOW (ref 34.5–45)
HGB BLD-MCNC: 8.6 G/DL — LOW (ref 11.5–15.5)
IMM GRANULOCYTES NFR BLD AUTO: 0.3 % — SIGNIFICANT CHANGE UP (ref 0–1.5)
INR BLD: 1.07 — SIGNIFICANT CHANGE UP (ref 0.88–1.17)
LYMPHOCYTES # BLD AUTO: 2.67 K/UL — SIGNIFICANT CHANGE UP (ref 1–3.3)
LYMPHOCYTES # BLD AUTO: 40 % — SIGNIFICANT CHANGE UP (ref 13–44)
MAGNESIUM SERPL-MCNC: 2.1 MG/DL — SIGNIFICANT CHANGE UP (ref 1.6–2.6)
MCHC RBC-ENTMCNC: 24.4 PG — LOW (ref 27–34)
MCHC RBC-ENTMCNC: 30.9 % — LOW (ref 32–36)
MCV RBC AUTO: 78.8 FL — LOW (ref 80–100)
MONOCYTES # BLD AUTO: 0.93 K/UL — HIGH (ref 0–0.9)
MONOCYTES NFR BLD AUTO: 13.9 % — SIGNIFICANT CHANGE UP (ref 2–14)
NEUTROPHILS # BLD AUTO: 2.75 K/UL — SIGNIFICANT CHANGE UP (ref 1.8–7.4)
NEUTROPHILS NFR BLD AUTO: 41.2 % — LOW (ref 43–77)
NRBC # FLD: 0 K/UL — LOW (ref 25–125)
PHOSPHATE SERPL-MCNC: 4.1 MG/DL — SIGNIFICANT CHANGE UP (ref 2.5–4.5)
PLATELET # BLD AUTO: 306 K/UL — SIGNIFICANT CHANGE UP (ref 150–400)
PMV BLD: 12.3 FL — SIGNIFICANT CHANGE UP (ref 7–13)
POTASSIUM SERPL-MCNC: 4.2 MMOL/L — SIGNIFICANT CHANGE UP (ref 3.5–5.3)
POTASSIUM SERPL-SCNC: 4.2 MMOL/L — SIGNIFICANT CHANGE UP (ref 3.5–5.3)
PROTHROM AB SERPL-ACNC: 11.9 SEC — SIGNIFICANT CHANGE UP (ref 9.8–13.1)
RBC # BLD: 3.53 M/UL — LOW (ref 3.8–5.2)
RBC # FLD: 14.9 % — HIGH (ref 10.3–14.5)
RH IG SCN BLD-IMP: POSITIVE — SIGNIFICANT CHANGE UP
SODIUM SERPL-SCNC: 142 MMOL/L — SIGNIFICANT CHANGE UP (ref 135–145)
WBC # BLD: 6.68 K/UL — SIGNIFICANT CHANGE UP (ref 3.8–10.5)
WBC # FLD AUTO: 6.68 K/UL — SIGNIFICANT CHANGE UP (ref 3.8–10.5)

## 2019-01-16 PROCEDURE — 43239 EGD BIOPSY SINGLE/MULTIPLE: CPT | Mod: GC

## 2019-01-16 PROCEDURE — 88305 TISSUE EXAM BY PATHOLOGIST: CPT | Mod: 26

## 2019-01-16 PROCEDURE — 45380 COLONOSCOPY AND BIOPSY: CPT | Mod: GC

## 2019-01-16 PROCEDURE — 99233 SBSQ HOSP IP/OBS HIGH 50: CPT | Mod: GC

## 2019-01-16 RX ORDER — PANTOPRAZOLE SODIUM 20 MG/1
1 TABLET, DELAYED RELEASE ORAL
Qty: 30 | Refills: 0 | OUTPATIENT
Start: 2019-01-16 | End: 2019-02-14

## 2019-01-16 RX ADMIN — PANTOPRAZOLE SODIUM 40 MILLIGRAM(S): 20 TABLET, DELAYED RELEASE ORAL at 06:00

## 2019-01-16 RX ADMIN — PANTOPRAZOLE SODIUM 40 MILLIGRAM(S): 20 TABLET, DELAYED RELEASE ORAL at 17:12

## 2019-01-16 NOTE — PROGRESS NOTE ADULT - ATTENDING COMMENTS
Verbal report from GI notable for upper GI polyps, no bleeding. Lower scope was apparently not optimally prepped. Plan is for non-urgent capsule endoscopy, may be completed as outpatient. Will discuss with pt. Time planning discharge 35 minutes.

## 2019-01-16 NOTE — DISCHARGE NOTE ADULT - PLAN OF CARE
Hgb 12-16 You came in with reports of black stools for 2 weeks and symptoms of lightheadedness and fatigue. Please follow up with your primary doctor. You came in with reports of black stools for 2 weeks and symptoms of lightheadedness and fatigue. Please follow up with your primary doctor in 1-2 weeks. Make sure to have your iron studies redone and hemoglobin checked eventually. Continue to take ___PPI___ . You came in with reports of black stools for 2 weeks and symptoms of lightheadedness and fatigue. Please follow up with your primary doctor in 1-2 weeks. Make sure to have your iron studies redone and hemoglobin checked eventually. Continue to take pantoprazole once a day, 30 mins before breakfast for a month. Please follow up with GI on January 22, 2019. You came in with reports of black stools for 2 weeks and symptoms of lightheadedness and fatigue. Please follow up with your primary doctor in 1-2 weeks. Make sure to have your iron studies redone and hemoglobin checked eventually. Continue to take pantoprazole once a day, 30 mins before breakfast for a month. Please follow up with GI within the next 1-2 weeks.

## 2019-01-16 NOTE — DISCHARGE NOTE ADULT - ADDITIONAL INSTRUCTIONS
Hector Fried), Gastroenterology  [address] [place] [address]  Phone: [phone]  Fax: [phone] Appointment on January 22, 2019 at 10 am with Lio Malik), Gastroenterology; Internal Medicine  [address] [facility]  [address] [place] [address]  Phone: [phone]  Fax: [phone] You have an appointment on January 22, 2019 at 10 am with Lio Malik (MD) If you wish to follow up with him. You may follow up with another Gastroenterologist if your desire.   Gastroenterology; Internal Medicine  [address] [place] [address]  Phone: [phone]  Fax: [phone]

## 2019-01-16 NOTE — DISCHARGE NOTE ADULT - CONDITIONS AT DISCHARGE
Patient is alert and oriented.  Stable for discharge.  Admitted with anemia and had blood transfusions and an endoscopy during this admission.  Will follow up with outpatient GI.  Discharge instructions and prescriptions were reviewed with patient and copies provided.

## 2019-01-16 NOTE — DISCHARGE NOTE ADULT - MEDICATION SUMMARY - MEDICATIONS TO TAKE
I will START or STAY ON the medications listed below when I get home from the hospital:    pantoprazole 40 mg oral delayed release tablet  -- 1 tab(s) by mouth once a day 30 minutes before breakfast  -- It is very important that you take or use this exactly as directed.  Do not skip doses or discontinue unless directed by your doctor.  Obtain medical advice before taking any non-prescription drugs as some may affect the action of this medication.  Swallow whole.  Do not crush.    -- Indication: For GIB (gastrointestinal bleeding)

## 2019-01-16 NOTE — PROGRESS NOTE ADULT - PROBLEM SELECTOR PLAN 2
- melanotic stools with epigastric tenderness suggestive of possible gastric ulcer as source  -management as above

## 2019-01-16 NOTE — PROGRESS NOTE ADULT - PROBLEM SELECTOR PLAN 1
Pt with reports of melena x2 weeks at home, found to have microcytic anemia. s/p 2 unit PRBC, Hgb 8.6 today  - NPO for EGD/Colonoscopy today  - PPi IV BID

## 2019-01-16 NOTE — DISCHARGE NOTE ADULT - PATIENT PORTAL LINK FT
You can access the [facility] Patient Portal, offered by [facility], by registering with the following website: [email or web address]

## 2019-01-16 NOTE — PROGRESS NOTE ADULT - SUBJECTIVE AND OBJECTIVE BOX
Enedelia Arevalo MD-PGY1  Department of Internal Medicine  Pager [phone]        SAMANTHA BECK  53y  MRN: [id number]    Patient is a 53y old  Female who presents with a chief complaint of Progressively short of breath with near syncopal episode, anemic on outside labs (15 Moo 2019 12:12)      Subjective: no events ON. Denies fever, CP, SOB, abn pain, N/V. Tolerating diet.      MEDICATIONS  (STANDING):  influenza   Vaccine 0.5 milliLiter(s) IntraMuscular once  pantoprazole  Injectable 40 milliGRAM(s) IV Push two times a day    MEDICATIONS  (PRN):  acetaminophen   Tablet .. 650 milliGRAM(s) Oral every 6 hours PRN Mild Pain (1 - 3), Moderate Pain (4 - 6)      Objective:    Vitals: Vital Signs Last 24 Hrs  T(C): 36.8 (01-16-19 @ 01:30), Max: 36.9 (01-15-19 @ 21:46)  T(F): 98.2 (01-16-19 @ 01:30), Max: 98.5 (01-15-19 @ 21:46)  HR: 69 (01-16-19 @ 01:30) (69 - 78)  BP: 102/66 (01-16-19 @ 01:30) (102/54 - 102/66)  BP(mean): --  RR: 17 (01-16-19 @ 01:30) (17 - 18)  SpO2: 98% (01-16-19 @ 01:30) (97% - 100%)              I&O's Summary    15 Moo 2019 07:01  -  16 Jan 2019 07:00  --------------------------------------------------------  IN: 1300 mL / OUT: 0 mL / NET: 1300 mL        PHYSICAL EXAM:  GENERAL: NAD  HEAD:  Atraumatic, Normocephalic  EYES: EOMI, conjunctiva and sclera clear  CHEST/LUNG: Clear to percussion bilaterally; No rales, rhonchi, wheezing  HEART: Regular rate and rhythm; No murmurs, rubs, or gallops  ABDOMEN: Soft, Nontender, Nondistended; no rebound or guarding  SKIN: No rashes or lesions  NERVOUS SYSTEM:  Alert & Oriented X3    LABS:                        8.6    6.68  )-----------( 306      ( 16 Jan 2019 05:30 )             27.8                         7.8    6.98  )-----------( 321      ( 15 Moo 2019 18:45 )             26.7                         7.8    6.64  )-----------( 310      ( 15 Moo 2019 06:30 )             25.6     01-16    142  |  108<H>  |  8   ----------------------------<  95  4.2   |  22  |  0.71  01-15    140  |  108<H>  |  11  ----------------------------<  100<H>  4.1   |  23  |  0.68  01-14    138  |  106  |  14  ----------------------------<  99  3.9   |  22  |  0.67    Ca    8.7      16 Jan 2019 05:30  Ca    8.7      15 Moo 2019 06:30  Ca    8.9      14 Jan 2019 13:10  Phos  4.1     01-16  Mg     2.1     01-16    TPro  6.3  /  Alb  3.8  /  TBili  0.4  /  DBili  x   /  AST  17  /  ALT  15  /  AlkPhos  64  01-15  TPro  6.9  /  Alb  3.9  /  TBili  0.2  /  DBili  x   /  AST  15  /  ALT  14  /  AlkPhos  70  01-14    PT/INR - ( 16 Jan 2019 05:30 )   PT: 11.9 SEC;   INR: 1.07          PTT - ( 16 Jan 2019 05:30 )  PTT:26.1 SEC                  CAPILLARY BLOOD GLUCOSE          RADIOLOGY & ADDITIONAL TESTS:  Imaging Personally Reviewed:  [x ] YES  [ ] NO    Consultants involved in case:   Consultant(s) Notes Reviewed:  [ x] YES  [ ] NO:   Care Discussed with Consultants/Other Providers [x ] YES  [ ] NO

## 2019-01-16 NOTE — DISCHARGE NOTE ADULT - CARE PROVIDERS DIRECT ADDRESSES
[email or web address]

## 2019-01-16 NOTE — DISCHARGE NOTE ADULT - HOSPITAL COURSE
HPI:  53 year old female with hx of hemorrhoids who presents because of anemia on blood work as part of near syncope work up. Patient reports that for the past several months has been progressively lethargic and noticing that her energy levels have been lower than usual. Roughly a week and a half ago, noticed that she has been having dark black stools without any evidence of bright red blood. Yesterday, was leaning forward to wipe something off the floor and felt like her vision was getting dark and like she was going to pass out but was able to brace herself and get to the chair. Did endorse palpitations at that time. Overall has been noticing that she is having trouble exerting herself as compared to baseline: will climb 2 flights of stairs and feels heavy and an out of body sensation, which resolved with rest.   States that she took some motrin over the past several days for a headache but denies constant NSAID use, social drinker and no hx of steroids. Does not smoke. Denies any weight loss. Does have some reflux but does not take anything regularly. Denies any family hx of colon CA (mother was transfusion dependent and had a polyp on colonoscopy, son has required colonoscopy at an early age for a cyst, nonmalignant and not autoimmune in nature) In the ED: Afberile HR 79 /69 RR 16 O2: 100 RA   Hospital Course:  Pt received 1 unit of pRBC with Hgb 6.8-->7.8 on 1/14. Pt was started on IV protonix 40 BID. A second unit of pRBC was given to optimize the pt and maintain Hgb >8 for EGD/Colonoscopy on 1/15. She was put on a clear liquid diet, followed by prep, and made NPO after midnight. EGD/colonoscopy performed on 1/16. It showed ____ HPI:  53 year old female with hx of hemorrhoids who presents because of anemia on blood work as part of near syncope work up. Patient reports that for the past several months has been progressively lethargic and noticing that her energy levels have been lower than usual. Roughly a week and a half ago, noticed that she has been having dark black stools without any evidence of bright red blood. Yesterday, was leaning forward to wipe something off the floor and felt like her vision was getting dark and like she was going to pass out but was able to brace herself and get to the chair. Did endorse palpitations at that time. Overall has been noticing that she is having trouble exerting herself as compared to baseline: will climb 2 flights of stairs and feels heavy and an out of body sensation, which resolved with rest.   States that she took some motrin over the past several days for a headache but denies constant NSAID use, social drinker and no hx of steroids. Does not smoke. Denies any weight loss. Does have some reflux but does not take anything regularly. Denies any family hx of colon CA (mother was transfusion dependent and had a polyp on colonoscopy, son has required colonoscopy at an early age for a cyst, nonmalignant and not autoimmune in nature) In the ED: Afberile HR 79 /69 RR 16 O2: 100 RA   Hospital Course:  Pt received 1 unit of pRBC with Hgb 6.8-->7.8 on 1/14. Pt was started on IV protonix 40 BID. A second unit of pRBC was given to optimize the pt and maintain Hgb >8 for EGD/Colonoscopy on 1/15. She was put on a clear liquid diet, followed by prep, and made NPO after midnight. EGD/colonoscopy performed on 1/16. It showed no ulcerations or obvious masses.

## 2019-01-16 NOTE — PROGRESS NOTE ADULT - PROBLEM SELECTOR PLAN 3
- patient has no hx of cerebrovascular disease, ischemic heart disease or prior hx of heart failure. Not diabetic and not on insulin, with creatinine of <2   - EKG: no evidence of ischemic changes  -troponins (-) x2  - METS score >10   - Patient is low risk for developing a major cardiac outcome for a low risk procedure

## 2019-01-16 NOTE — DISCHARGE NOTE ADULT - CARE PROVIDER_API CALL
Hector Fried), Gastroenterology  [address] [place] [id number]  Phone: [phone]  Fax: [phone] Lio Padilla), Gastroenterology; Internal Medicine  [address] [facility]  [address] [place] [address]  Phone: [phone]  Fax: [phone]

## 2019-01-17 VITALS
TEMPERATURE: 99 F | OXYGEN SATURATION: 99 % | HEART RATE: 72 BPM | DIASTOLIC BLOOD PRESSURE: 64 MMHG | RESPIRATION RATE: 17 BRPM | SYSTOLIC BLOOD PRESSURE: 11 MMHG

## 2019-01-17 LAB
BASOPHILS # BLD AUTO: 0.07 K/UL — SIGNIFICANT CHANGE UP (ref 0–0.2)
BASOPHILS NFR BLD AUTO: 0.9 % — SIGNIFICANT CHANGE UP (ref 0–2)
EOSINOPHIL # BLD AUTO: 0.27 K/UL — SIGNIFICANT CHANGE UP (ref 0–0.5)
EOSINOPHIL NFR BLD AUTO: 3.5 % — SIGNIFICANT CHANGE UP (ref 0–6)
HCT VFR BLD CALC: 30.1 % — LOW (ref 34.5–45)
HGB BLD-MCNC: 9 G/DL — LOW (ref 11.5–15.5)
IMM GRANULOCYTES NFR BLD AUTO: 0.3 % — SIGNIFICANT CHANGE UP (ref 0–1.5)
LYMPHOCYTES # BLD AUTO: 2.75 K/UL — SIGNIFICANT CHANGE UP (ref 1–3.3)
LYMPHOCYTES # BLD AUTO: 35.9 % — SIGNIFICANT CHANGE UP (ref 13–44)
MCHC RBC-ENTMCNC: 23.9 PG — LOW (ref 27–34)
MCHC RBC-ENTMCNC: 29.9 % — LOW (ref 32–36)
MCV RBC AUTO: 79.8 FL — LOW (ref 80–100)
MONOCYTES # BLD AUTO: 0.85 K/UL — SIGNIFICANT CHANGE UP (ref 0–0.9)
MONOCYTES NFR BLD AUTO: 11.1 % — SIGNIFICANT CHANGE UP (ref 2–14)
NEUTROPHILS # BLD AUTO: 3.7 K/UL — SIGNIFICANT CHANGE UP (ref 1.8–7.4)
NEUTROPHILS NFR BLD AUTO: 48.3 % — SIGNIFICANT CHANGE UP (ref 43–77)
NRBC # FLD: 0 K/UL — LOW (ref 25–125)
PLATELET # BLD AUTO: 312 K/UL — SIGNIFICANT CHANGE UP (ref 150–400)
PMV BLD: 12.8 FL — SIGNIFICANT CHANGE UP (ref 7–13)
RBC # BLD: 3.77 M/UL — LOW (ref 3.8–5.2)
RBC # FLD: 15.5 % — HIGH (ref 10.3–14.5)
WBC # BLD: 7.66 K/UL — SIGNIFICANT CHANGE UP (ref 3.8–10.5)
WBC # FLD AUTO: 7.66 K/UL — SIGNIFICANT CHANGE UP (ref 3.8–10.5)

## 2019-01-17 PROCEDURE — 99232 SBSQ HOSP IP/OBS MODERATE 35: CPT | Mod: GC

## 2019-01-17 PROCEDURE — 99239 HOSP IP/OBS DSCHRG MGMT >30: CPT

## 2019-01-17 RX ORDER — PANTOPRAZOLE SODIUM 20 MG/1
1 TABLET, DELAYED RELEASE ORAL
Qty: 30 | Refills: 0 | OUTPATIENT
Start: 2019-01-17 | End: 2019-02-15

## 2019-01-17 RX ADMIN — PANTOPRAZOLE SODIUM 40 MILLIGRAM(S): 20 TABLET, DELAYED RELEASE ORAL at 05:47

## 2019-01-17 NOTE — PROGRESS NOTE ADULT - ASSESSMENT
53 year old female with hx of hemorrhoids who presents because of anemia on blood work with melanotic stool concerning for GIB 53 year old female with hx of hemorrhoids who presents because of anemia on blood work with melanotic stool concerning for GIB s/p EGD and colonoscopy

## 2019-01-17 NOTE — PROGRESS NOTE ADULT - PROBLEM SELECTOR PLAN 1
Pt with reports of melena x2 weeks at home, found to have microcytic anemia. s/p 2 unit PRBC, Hgb 8.6 today  - NPO for EGD/Colonoscopy today  - PPi IV BID Pt with reports of melena x2 weeks at home, found to have microcytic anemia. s/p 2 unit PRBC, Hgb 9 today  - s/p EGD/Colonoscopy with finding of cecal polyp.    - PPI po qD  -plan for outpt capsule study

## 2019-01-17 NOTE — PROGRESS NOTE ADULT - PROBLEM SELECTOR PLAN 2
- melanotic stools with epigastric tenderness suggestive of possible gastric ulcer as source  -management as above -management as above

## 2019-01-17 NOTE — PROGRESS NOTE ADULT - SUBJECTIVE AND OBJECTIVE BOX
Chief Complaint:  Patient is a 53y old  Female who presents with a chief complaint of Progressively short of breath with near syncopal episode, anemic on outside labs (17 Jan 2019 06:51)      Interval Events:   Patient with EGD/colon done yesterday with significant finding of cecal polyp.      Allergies:  penicillin (Rash)  sulfa drugs (Other)      Hospital Medications:  acetaminophen   Tablet .. 650 milliGRAM(s) Oral every 6 hours PRN  influenza   Vaccine 0.5 milliLiter(s) IntraMuscular once  pantoprazole  Injectable 40 milliGRAM(s) IV Push two times a day      PMHX/PSHX:  Hemorrhoids  No pertinent past medical history  S/P partial hysterectomy  Endometriosis      Family history:  No pertinent family history in first degree relatives          PHYSICAL EXAM:     GENERAL:  Appears stated age, well-groomed, well-nourished, no distress  HEENT:  NC/AT,  conjunctivae clear, sclera -anicteric  CHEST:  Full & symmetric excursion, no increased  HEART:  Regular rhythm  ABDOMEN:  Soft, non-tender, non-distended, normoactive bowel sounds,  no masses ,no hepato-splenomegaly,   EXTREMITIES:  no cyanosis,clubbing or edema  SKIN:  No rash/erythema/ecchymoses/petechiae/wounds/abscess/warm/dry  NEURO:  Alert, oriented    Vital Signs:  Vital Signs Last 24 Hrs  T(C): 37 (17 Jan 2019 05:42), Max: 37 (17 Jan 2019 05:42)  T(F): 98.6 (17 Jan 2019 05:42), Max: 98.6 (17 Jan 2019 05:42)  HR: 72 (17 Jan 2019 05:42) (72 - 82)  BP: 11/64 (17 Jan 2019 05:42) (11/64 - 114/64)  BP(mean): --  RR: 17 (17 Jan 2019 05:42) (17 - 18)  SpO2: 99% (17 Jan 2019 05:42) (97% - 99%)  Daily     Daily     LABS:                        9.0    7.66  )-----------( 312      ( 17 Jan 2019 05:36 )             30.1     01-16    142  |  108<H>  |  8   ----------------------------<  95  4.2   |  22  |  0.71    Ca    8.7      16 Jan 2019 05:30  Phos  4.1     01-16  Mg     2.1     01-16        PT/INR - ( 16 Jan 2019 05:30 )   PT: 11.9 SEC;   INR: 1.07          PTT - ( 16 Jan 2019 05:30 )  PTT:26.1 SEC        Imaging:

## 2019-01-17 NOTE — PROGRESS NOTE ADULT - PROBLEM SELECTOR PLAN 3
- patient has no hx of cerebrovascular disease, ischemic heart disease or prior hx of heart failure. Not diabetic and not on insulin, with creatinine of <2   - EKG: no evidence of ischemic changes  -troponins (-) x2  - METS score >10   - Patient is low risk for developing a major cardiac outcome for a low risk procedure - DVT PPx: Improve score of 0, no pharmacologic DVT PPx

## 2019-01-17 NOTE — PROGRESS NOTE ADULT - ATTENDING COMMENTS
No further symptoms or signs of bleeding. Vitals stable, hgb increasing. Plan outpatient VCE. Time planning discharge 35 minutes.

## 2019-01-17 NOTE — PROGRESS NOTE ADULT - ASSESSMENT
Impression;  1. Anemia iron def- DDX includes CRC, PUD, gastric cancer, angioectasias  2. Chest pressure likely symptomatic anemia, need to r/o ACS    Plan:   - follow up pathology   - outpatient capsule and GI follow up   - please call GI back with any further questions    Eleonora Simmons, PGY-4  Gastroenterology Fellow  Pager x [phone] or [phone]  (After 5 pm or on weekends please page GI on call)

## 2019-01-17 NOTE — PROGRESS NOTE ADULT - SUBJECTIVE AND OBJECTIVE BOX
Enedelia Arevalo MD-PGY1  Department of Internal Medicine  Pager [phone]        SAMANTHA BECK  53y  MRN: [id number]    Patient is a 53y old  Female who presents with a chief complaint of Progressively short of breath with near syncopal episode, anemic on outside labs (16 Jan 2019 09:53)      Subjective: no events ON. Denies fever, CP, SOB, abn pain, N/V. Tolerating diet.      MEDICATIONS  (STANDING):  influenza   Vaccine 0.5 milliLiter(s) IntraMuscular once  pantoprazole  Injectable 40 milliGRAM(s) IV Push two times a day    MEDICATIONS  (PRN):  acetaminophen   Tablet .. 650 milliGRAM(s) Oral every 6 hours PRN Mild Pain (1 - 3), Moderate Pain (4 - 6)      Objective:    Vitals: Vital Signs Last 24 Hrs  T(C): 37 (01-17-19 @ 05:42), Max: 37 (01-17-19 @ 05:42)  T(F): 98.6 (01-17-19 @ 05:42), Max: 98.6 (01-17-19 @ 05:42)  HR: 72 (01-17-19 @ 05:42) (72 - 82)  BP: 11/64 (01-17-19 @ 05:42) (11/64 - 114/64)  BP(mean): --  RR: 17 (01-17-19 @ 05:42) (17 - 18)  SpO2: 99% (01-17-19 @ 05:42) (97% - 99%)              I&O's Summary    15 Moo 2019 07:01  -  16 Jan 2019 07:00  --------------------------------------------------------  IN: 1300 mL / OUT: 0 mL / NET: 1300 mL        PHYSICAL EXAM:  GENERAL: NAD  HEAD:  Atraumatic, Normocephalic  EYES: EOMI, conjunctiva and sclera clear  CHEST/LUNG: Clear to percussion bilaterally; No rales, rhonchi, wheezing  HEART: Regular rate and rhythm; No murmurs, rubs, or gallops  ABDOMEN: Soft, Nontender, Nondistended; no rebound or guarding  SKIN: No rashes or lesions  NERVOUS SYSTEM:  Alert & Oriented X3    LABS:                        9.0    7.66  )-----------( 312      ( 17 Jan 2019 05:36 )             30.1                         8.6    6.68  )-----------( 306      ( 16 Jan 2019 05:30 )             27.8                         7.8    6.98  )-----------( 321      ( 15 Moo 2019 18:45 )             26.7     01-16    142  |  108<H>  |  8   ----------------------------<  95  4.2   |  22  |  0.71  01-15    140  |  108<H>  |  11  ----------------------------<  100<H>  4.1   |  23  |  0.68  01-14    138  |  106  |  14  ----------------------------<  99  3.9   |  22  |  0.67    Ca    8.7      16 Jan 2019 05:30  Ca    8.7      15 Moo 2019 06:30  Ca    8.9      14 Jan 2019 13:10  Phos  4.1     01-16  Mg     2.1     01-16    TPro  6.3  /  Alb  3.8  /  TBili  0.4  /  DBili  x   /  AST  17  /  ALT  15  /  AlkPhos  64  01-15  TPro  6.9  /  Alb  3.9  /  TBili  0.2  /  DBili  x   /  AST  15  /  ALT  14  /  AlkPhos  70  01-14    PT/INR - ( 16 Jan 2019 05:30 )   PT: 11.9 SEC;   INR: 1.07          PTT - ( 16 Jan 2019 05:30 )  PTT:26.1 SEC                  CAPILLARY BLOOD GLUCOSE          RADIOLOGY & ADDITIONAL TESTS:  Imaging Personally Reviewed:  [x ] YES  [ ] NO    Consultants involved in case:   Consultant(s) Notes Reviewed:  [ x] YES  [ ] NO:   Care Discussed with Consultants/Other Providers [x ] YES  [ ] NO

## 2019-01-17 NOTE — PROGRESS NOTE ADULT - REASON FOR ADMISSION
Progressively short of breath with near syncopal episode, anemic on outside labs

## 2019-01-17 NOTE — PROGRESS NOTE ADULT - PROBLEM SELECTOR PLAN 4
- DVT PPx: Improve score of 0, no pharmacologic DVT PPx

## 2019-01-22 ENCOUNTER — APPOINTMENT (OUTPATIENT)
Dept: GASTROENTEROLOGY | Facility: CLINIC | Age: 54
End: 2019-01-22
Payer: COMMERCIAL

## 2019-01-22 VITALS — SYSTOLIC BLOOD PRESSURE: 110 MMHG | DIASTOLIC BLOOD PRESSURE: 70 MMHG

## 2019-01-22 VITALS
BODY MASS INDEX: 29.25 KG/M2 | WEIGHT: 182 LBS | TEMPERATURE: 98.6 F | HEART RATE: 80 BPM | OXYGEN SATURATION: 98 % | HEIGHT: 66 IN

## 2019-01-22 DIAGNOSIS — Z82.49 FAMILY HISTORY OF ISCHEMIC HEART DISEASE AND OTHER DISEASES OF THE CIRCULATORY SYSTEM: ICD-10-CM

## 2019-01-22 DIAGNOSIS — D12.0 BENIGN NEOPLASM OF CECUM: ICD-10-CM

## 2019-01-22 DIAGNOSIS — Z78.9 OTHER SPECIFIED HEALTH STATUS: ICD-10-CM

## 2019-01-22 DIAGNOSIS — Z83.2 FAMILY HISTORY OF DISEASES OF THE BLOOD AND BLOOD-FORMING ORGANS AND CERTAIN DISORDERS INVOLVING THE IMMUNE MECHANISM: ICD-10-CM

## 2019-01-22 DIAGNOSIS — K92.2 GASTROINTESTINAL HEMORRHAGE, UNSPECIFIED: ICD-10-CM

## 2019-01-22 DIAGNOSIS — Z09 ENCOUNTER FOR FOLLOW-UP EXAMINATION AFTER COMPLETED TREATMENT FOR CONDITIONS OTHER THAN MALIGNANT NEOPLASM: ICD-10-CM

## 2019-01-22 DIAGNOSIS — Z83.71 FAMILY HISTORY OF COLONIC POLYPS: ICD-10-CM

## 2019-01-22 DIAGNOSIS — D50.9 IRON DEFICIENCY ANEMIA, UNSPECIFIED: ICD-10-CM

## 2019-01-22 DIAGNOSIS — Z83.3 FAMILY HISTORY OF DIABETES MELLITUS: ICD-10-CM

## 2019-01-22 PROCEDURE — 99204 OFFICE O/P NEW MOD 45 MIN: CPT

## 2019-01-22 RX ORDER — PANTOPRAZOLE 40 MG/1
40 TABLET, DELAYED RELEASE ORAL
Refills: 0 | Status: ACTIVE | COMMUNITY

## 2019-01-22 NOTE — HISTORY OF PRESENT ILLNESS
[de-identified] : 53 year old woman recently discharged from [facility]. She was admitted on 1/14 fro GI bleeding. She complained of weakness and melena and was found to have a Hgb of 6.8. She was transfused 2 units of blood. the patient denies gross rectal bleeding or hematemesis. Her stools are now normal and she is on no anticoagulants. She denies taking NSAID's and has had no previous episodes of GI  bleeding. EGD was negative. Colonoscopy showed a small cecal polyp that was removed. She is referred for SB capsule endoscopy.

## 2019-01-22 NOTE — PHYSICAL EXAM
[General Appearance - Alert] : alert [General Appearance - In No Acute Distress] : in no acute distress [Sclera] : the sclera and conjunctiva were normal [PERRL With Normal Accommodation] : pupils were equal in size, round, and reactive to light [Extraocular Movements] : extraocular movements were intact [Outer Ear] : the ears and nose were normal in appearance [Oropharynx] : the oropharynx was normal [Neck Appearance] : the appearance of the neck was normal [Neck Cervical Mass (___cm)] : no neck mass was observed [Jugular Venous Distention Increased] : there was no jugular-venous distention [Thyroid Diffuse Enlargement] : the thyroid was not enlarged [Thyroid Nodule] : there were no palpable thyroid nodules [Auscultation Breath Sounds / Voice Sounds] : lungs were clear to auscultation bilaterally [Heart Rate And Rhythm] : heart rate was normal and rhythm regular [Heart Sounds] : normal S1 and S2 [Heart Sounds Gallop] : no gallops [Murmurs] : no murmurs [Heart Sounds Pericardial Friction Rub] : no pericardial rub [Bowel Sounds] : normal bowel sounds [Abdomen Soft] : soft [Abdomen Tenderness] : non-tender [] : no hepato-splenomegaly [Abdomen Mass (___ Cm)] : no abdominal mass palpated [Cervical Lymph Nodes Enlarged Posterior Bilaterally] : posterior cervical [Cervical Lymph Nodes Enlarged Anterior Bilaterally] : anterior cervical [Supraclavicular Lymph Nodes Enlarged Bilaterally] : supraclavicular [No CVA Tenderness] : no ~M costovertebral angle tenderness [No Spinal Tenderness] : no spinal tenderness [Abnormal Walk] : normal gait [Nail Clubbing] : no clubbing  or cyanosis of the fingernails [Musculoskeletal - Swelling] : no joint swelling seen [Motor Tone] : muscle strength and tone were normal [Deep Tendon Reflexes (DTR)] : deep tendon reflexes were 2+ and symmetric [Sensation] : the sensory exam was normal to light touch and pinprick [No Focal Deficits] : no focal deficits [Oriented To Time, Place, And Person] : oriented to person, place, and time [Impaired Insight] : insight and judgment were intact [Affect] : the affect was normal

## 2019-09-02 PROBLEM — Z09 FOLLOW UP: Status: ACTIVE | Noted: 2019-01-22

## 2023-06-02 NOTE — PATIENT PROFILE ADULT - BRADEN NUTRITION
Physical Therapy Visit    Visit Type: Daily Treatment Note  Visit: 12  Referring Provider: Paras Lagunas PA-C  Medical Diagnosis (from order): Diagnosis Information    Diagnosis  717.9 (ICD-9-CM) - M23.92 (ICD-10-CM) - Internal derangement of left knee         SUBJECTIVE                                                                                                               Knee isn't feeling too bad today, some medial pain and then pain over patellar tendon with stairs.     Pain / Symptoms  - Pain rating (out of 10): Current: 2       OBJECTIVE                                                                                                                                       Treatment    Ultrasound (18551)  - Location: L medial knee  - Position: supine  - Duty Cycle: 50%  - Frequency: 3 Mhz  - Intensity (w/cm2): 2.0  - Duration: 10 minutes    Results: decreased pain  Reaction: no adverse reaction to treatment      Therapeutic Exercise  Squats x 10   Single leg press 2 x 10 - 40#  Walking lunges x 10 bilateral   Standing adductor stretch   Single leg RDL x 10   4\" step downs x 2 - stopped due to pain     Manual Therapy   STM along L knee adductors, medial hamstring      Skilled input: verbal instruction/cues and tactile instruction/cues    Writer verbally educated and received verbal consent for hand placement, positioning of patient, and techniques to be performed today from patient for hand placement and palpation for techniques as described above and how they are pertinent to the patient's plan of care.      ASSESSMENT                                                                                                            Pain along the pes anserine bursa today along with associated tightness in the adductors and hamstrings. No pain with exercises besides step downs - discussed continuing progression on HEP.   Pain/symptoms after session (out of 10): 3    PLAN                                                                                                                            Suggestions for next session as indicated: Progress per plan of care, continue with manual and US as needed, progress strength        Therapy procedure time and total treatment time can be found documented on the Time Entry flowsheet     (3) adequate

## 2024-12-06 NOTE — H&P ADULT - NSCORESITESY/N_GEN_A_CORE_RD
Refill approved for 1 month.  Ginger Jerry needs to be seen for an appointment before further refills are given.    
No